# Patient Record
Sex: MALE | Race: WHITE | URBAN - METROPOLITAN AREA
[De-identification: names, ages, dates, MRNs, and addresses within clinical notes are randomized per-mention and may not be internally consistent; named-entity substitution may affect disease eponyms.]

---

## 2017-08-18 ENCOUNTER — HOSPITAL ENCOUNTER (OUTPATIENT)
Dept: OTHER | Age: 67
Discharge: HOME | End: 2017-08-18
Attending: NURSE PRACTITIONER | Admitting: NURSE PRACTITIONER

## 2017-08-18 DIAGNOSIS — M79.671 RIGHT FOOT PAIN: ICD-10-CM

## 2017-08-18 DIAGNOSIS — M25.571 ACUTE RIGHT ANKLE PAIN: ICD-10-CM

## 2017-08-30 ENCOUNTER — OFFICE VISIT (OUTPATIENT)
Dept: ORTHOPEDIC SURGERY | Age: 67
End: 2017-08-30

## 2017-08-30 VITALS
SYSTOLIC BLOOD PRESSURE: 135 MMHG | BODY MASS INDEX: 41.52 KG/M2 | DIASTOLIC BLOOD PRESSURE: 72 MMHG | WEIGHT: 290 LBS | HEIGHT: 70 IN | HEART RATE: 78 BPM

## 2017-08-30 DIAGNOSIS — M76.61 ACHILLES TENDINITIS, RIGHT LEG: ICD-10-CM

## 2017-08-30 DIAGNOSIS — M76.71 PERONEAL TENDINITIS, RIGHT: ICD-10-CM

## 2017-08-30 DIAGNOSIS — M25.371 ANKLE INSTABILITY, RIGHT: Primary | ICD-10-CM

## 2017-08-30 PROBLEM — M76.70 PERONEAL TENDINITIS: Status: ACTIVE | Noted: 2017-08-30

## 2017-08-30 PROBLEM — M25.373 ANKLE INSTABILITY: Status: ACTIVE | Noted: 2017-08-30

## 2017-08-30 PROCEDURE — 1123F ACP DISCUSS/DSCN MKR DOCD: CPT | Performed by: PODIATRIST

## 2017-08-30 PROCEDURE — G8427 DOCREV CUR MEDS BY ELIG CLIN: HCPCS | Performed by: PODIATRIST

## 2017-08-30 PROCEDURE — 99203 OFFICE O/P NEW LOW 30 MIN: CPT | Performed by: PODIATRIST

## 2017-08-30 PROCEDURE — L1902 AFO ANKLE GAUNTLET PRE OTS: HCPCS | Performed by: PODIATRIST

## 2017-08-30 PROCEDURE — 1036F TOBACCO NON-USER: CPT | Performed by: PODIATRIST

## 2017-08-30 PROCEDURE — G8417 CALC BMI ABV UP PARAM F/U: HCPCS | Performed by: PODIATRIST

## 2017-08-30 PROCEDURE — 3017F COLORECTAL CA SCREEN DOC REV: CPT | Performed by: PODIATRIST

## 2017-08-30 PROCEDURE — 4040F PNEUMOC VAC/ADMIN/RCVD: CPT | Performed by: PODIATRIST

## 2025-03-31 ENCOUNTER — TRANSCRIBE ORDERS (OUTPATIENT)
Dept: ADMINISTRATIVE | Age: 75
End: 2025-03-31

## 2025-03-31 DIAGNOSIS — M79.89 SWELLING OF RIGHT LOWER EXTREMITY: Primary | ICD-10-CM

## 2025-04-02 ENCOUNTER — OFFICE VISIT (OUTPATIENT)
Dept: ORTHOPEDIC SURGERY | Age: 75
End: 2025-04-02
Payer: MEDICARE

## 2025-04-02 VITALS — HEIGHT: 71 IN | WEIGHT: 290 LBS | BODY MASS INDEX: 40.6 KG/M2

## 2025-04-02 DIAGNOSIS — M25.561 RIGHT KNEE PAIN, UNSPECIFIED CHRONICITY: Primary | ICD-10-CM

## 2025-04-02 PROCEDURE — 1123F ACP DISCUSS/DSCN MKR DOCD: CPT | Performed by: STUDENT IN AN ORGANIZED HEALTH CARE EDUCATION/TRAINING PROGRAM

## 2025-04-02 PROCEDURE — G8427 DOCREV CUR MEDS BY ELIG CLIN: HCPCS | Performed by: STUDENT IN AN ORGANIZED HEALTH CARE EDUCATION/TRAINING PROGRAM

## 2025-04-02 PROCEDURE — 1125F AMNT PAIN NOTED PAIN PRSNT: CPT | Performed by: STUDENT IN AN ORGANIZED HEALTH CARE EDUCATION/TRAINING PROGRAM

## 2025-04-02 PROCEDURE — 20610 DRAIN/INJ JOINT/BURSA W/O US: CPT | Performed by: STUDENT IN AN ORGANIZED HEALTH CARE EDUCATION/TRAINING PROGRAM

## 2025-04-02 PROCEDURE — 1159F MED LIST DOCD IN RCRD: CPT | Performed by: STUDENT IN AN ORGANIZED HEALTH CARE EDUCATION/TRAINING PROGRAM

## 2025-04-02 PROCEDURE — 1036F TOBACCO NON-USER: CPT | Performed by: STUDENT IN AN ORGANIZED HEALTH CARE EDUCATION/TRAINING PROGRAM

## 2025-04-02 PROCEDURE — 99203 OFFICE O/P NEW LOW 30 MIN: CPT | Performed by: STUDENT IN AN ORGANIZED HEALTH CARE EDUCATION/TRAINING PROGRAM

## 2025-04-02 PROCEDURE — G8417 CALC BMI ABV UP PARAM F/U: HCPCS | Performed by: STUDENT IN AN ORGANIZED HEALTH CARE EDUCATION/TRAINING PROGRAM

## 2025-04-02 PROCEDURE — 3017F COLORECTAL CA SCREEN DOC REV: CPT | Performed by: STUDENT IN AN ORGANIZED HEALTH CARE EDUCATION/TRAINING PROGRAM

## 2025-04-02 RX ORDER — LIDOCAINE HYDROCHLORIDE 10 MG/ML
4 INJECTION, SOLUTION INFILTRATION; PERINEURAL ONCE
Status: COMPLETED | OUTPATIENT
Start: 2025-04-02 | End: 2025-04-02

## 2025-04-02 RX ORDER — METHYLPREDNISOLONE ACETATE 40 MG/ML
80 INJECTION, SUSPENSION INTRA-ARTICULAR; INTRALESIONAL; INTRAMUSCULAR; SOFT TISSUE ONCE
Status: COMPLETED | OUTPATIENT
Start: 2025-04-02 | End: 2025-04-02

## 2025-04-02 RX ADMIN — METHYLPREDNISOLONE ACETATE 80 MG: 40 INJECTION, SUSPENSION INTRA-ARTICULAR; INTRALESIONAL; INTRAMUSCULAR; SOFT TISSUE at 14:59

## 2025-04-02 RX ADMIN — LIDOCAINE HYDROCHLORIDE 4 ML: 10 INJECTION, SOLUTION INFILTRATION; PERINEURAL at 14:58

## 2025-04-02 NOTE — PROGRESS NOTES
Date:  2025    Name:  Ramana Albert  Address:  [address] [facility] [address]    :  1950      Age:   74 y.o.    SSN:  [id number]      Medical Record Number:  [id number]    Reason for Visit:    Chief Complaint    Knee Pain (NP RT. KNEE)      DOS:2025     HPI: Ramana Albert is a 74 y.o. male here today for new patient evaluation regarding his right knee    History of Present Illness  The patient is a 74-year-old male here today for a new patient evaluation regarding his right knee. He was referred by Maddi from [facility] due to persistent right knee pain that has been troubling him for the past 2 weeks. This is his first encounter with such an issue, and he has no history of previous surgeries on the same knee. He recalls a recent incident where he descended a ladder awkwardly, resulting in a twist to his knee. Despite expecting the pain to subside within a few days, it has remained constant.     He reports no instances of his leg locking or giving way and also reports no numbness in the leg. He does not experience any sensations of grinding, clicking, or popping in the knee. Discomfort is noted when seated in his car due to the bolster pressing against his knee, and prolonged driving exacerbates this discomfort. He can stand without experiencing pain, but walking and twisting movements trigger the pain. He is not diabetic.    SOCIAL HISTORY  Occupations: Retired           ROS: All systems reviewed on patient intake form.  Pertinent items are noted in HPI.        No past medical history on file.     No past surgical history on file.    No family history on file.    Social History     Socioeconomic History    Marital status:      Spouse name: None    Number of children: None    Years of education: None    Highest education level: None   Tobacco Use    Smoking status: Never    Smokeless tobacco: Never   Substance and Sexual Activity    Alcohol use: Yes     Comment: social

## 2025-07-01 ENCOUNTER — OFFICE VISIT (OUTPATIENT)
Dept: ORTHOPEDIC SURGERY | Age: 75
End: 2025-07-01
Payer: MEDICARE

## 2025-07-01 DIAGNOSIS — M25.561 RIGHT KNEE PAIN, UNSPECIFIED CHRONICITY: Primary | ICD-10-CM

## 2025-07-01 PROCEDURE — 99214 OFFICE O/P EST MOD 30 MIN: CPT | Performed by: STUDENT IN AN ORGANIZED HEALTH CARE EDUCATION/TRAINING PROGRAM

## 2025-07-01 PROCEDURE — 1123F ACP DISCUSS/DSCN MKR DOCD: CPT | Performed by: STUDENT IN AN ORGANIZED HEALTH CARE EDUCATION/TRAINING PROGRAM

## 2025-07-01 PROCEDURE — 1159F MED LIST DOCD IN RCRD: CPT | Performed by: STUDENT IN AN ORGANIZED HEALTH CARE EDUCATION/TRAINING PROGRAM

## 2025-07-01 PROCEDURE — 1036F TOBACCO NON-USER: CPT | Performed by: STUDENT IN AN ORGANIZED HEALTH CARE EDUCATION/TRAINING PROGRAM

## 2025-07-01 PROCEDURE — G8417 CALC BMI ABV UP PARAM F/U: HCPCS | Performed by: STUDENT IN AN ORGANIZED HEALTH CARE EDUCATION/TRAINING PROGRAM

## 2025-07-01 PROCEDURE — 3017F COLORECTAL CA SCREEN DOC REV: CPT | Performed by: STUDENT IN AN ORGANIZED HEALTH CARE EDUCATION/TRAINING PROGRAM

## 2025-07-01 PROCEDURE — G8427 DOCREV CUR MEDS BY ELIG CLIN: HCPCS | Performed by: STUDENT IN AN ORGANIZED HEALTH CARE EDUCATION/TRAINING PROGRAM

## 2025-07-01 NOTE — PROGRESS NOTES
pain. He is not diabetic.    Pain Assessment  Location of Pain: Knee  Location Modifiers: Right  Severity of Pain: 10  Quality of Pain: Sharp, Throbbing  Duration of Pain: Persistent  Frequency of Pain: Constant    Medical History:  Patient's medications, allergies, past medical, surgical, social and family histories were reviewed and updated as appropriate.    Pertinent items are noted in HPI  Review of systems reviewed from Patient History Form dated on 7/1/25 and available in the patient's chart under the Media tab.       Vital Signs:  There were no vitals filed for this visit.      Constitutional: In no apparent distress. Normal affect. Alert and oriented X3 and is cooperative.       Physical Exam  Musculoskeletal:  Left knee: Exquisite tenderness along the medial joint line with a positive Apley grind and a positive Leann. Range of motion is 0 to 90 degrees. Significant pain with full extension in the posterior medial aspect.  Musculoskeletal:  Right Knee:  - Full range of motion with minimal pain.  - No patellofemoral crepitation.  - Very tender to the medial joint line.  - Positive Apley grind and Leann to the medial joint.  Others: Increased nonpitting edema noted to the calf but nontender to palpation about the calf. Negative Homans' sign                 Diagnostics:  Radiology:        4 views of the right knee demonstrate mild patellofemoral osteoarthritis with overall well-preserved medial lateral joint spaces.     Results  Imaging   - X-ray of the knees: No significant arthritis           Assessment: 74 y.o. male with acute right knee injury concern for medial meniscus tear, mild arthritis     Comorbidities: BMI 40    Impression:  Encounter Diagnosis   Name Primary?    Right knee pain, unspecified chronicity Yes       Office Procedures:  Orders Placed This Encounter   Procedures    MRI KNEE RIGHT WO CONTRAST     Standing Status:   Future     Expected Date:   7/1/2025     Expiration Date:   7/1/2026

## 2025-07-07 ENCOUNTER — TELEPHONE (OUTPATIENT)
Dept: ORTHOPEDIC SURGERY | Age: 75
End: 2025-07-07

## 2025-07-07 NOTE — TELEPHONE ENCOUNTER
No Pre-Cert for MRI for Rt Knee   Patients primary insurance is Medicare A&B   Medicare A&B does Not Require Pre-Cert.     PT INFORMED HE CAN CALL [facility] TO SCHEDULE MRI IT HAS BEEN FAXED TO THEM

## 2025-07-15 ENCOUNTER — TELEPHONE (OUTPATIENT)
Dept: ORTHOPEDIC SURGERY | Age: 75
End: 2025-07-15

## 2025-07-15 NOTE — TELEPHONE ENCOUNTER
Prescription Refill      Medication Name: PAIN MEDS   Pharmacy: [facility]  Patient Contact Number: [phone]    Please call patient to make aware once meds are sent

## 2025-07-18 ENCOUNTER — HOSPITAL ENCOUNTER (OUTPATIENT)
Dept: LAB | Age: 75
Discharge: HOME | End: 2025-07-18
Payer: MEDICARE

## 2025-07-18 ENCOUNTER — TELEPHONE (OUTPATIENT)
Dept: ORTHOPEDIC SURGERY | Age: 75
End: 2025-07-18

## 2025-07-18 ENCOUNTER — OFFICE VISIT (OUTPATIENT)
Dept: ORTHOPEDIC SURGERY | Age: 75
End: 2025-07-18

## 2025-07-18 VITALS — BODY MASS INDEX: 40.6 KG/M2 | HEIGHT: 71 IN | WEIGHT: 290 LBS

## 2025-07-18 DIAGNOSIS — M25.561 RIGHT KNEE PAIN, UNSPECIFIED CHRONICITY: Primary | ICD-10-CM

## 2025-07-18 DIAGNOSIS — S83.231A COMPLEX TEAR OF MEDIAL MENISCUS OF RIGHT KNEE AS CURRENT INJURY, INITIAL ENCOUNTER: ICD-10-CM

## 2025-07-18 LAB
ANION GAP SERPL CALCULATED.3IONS-SCNC: 11 MMOL/L (ref 3–16)
BASOPHILS # BLD: 0.1 K/UL (ref 0–0.2)
BASOPHILS NFR BLD: 1.1 %
BUN SERPL-MCNC: 26 MG/DL (ref 7–20)
CALCIUM SERPL-MCNC: 9.3 MG/DL (ref 8.3–10.6)
CHLORIDE SERPL-SCNC: 105 MMOL/L (ref 99–110)
CO2 SERPL-SCNC: 25 MMOL/L (ref 21–32)
CREAT SERPL-MCNC: 1 MG/DL (ref 0.8–1.3)
DEPRECATED RDW RBC AUTO: 17.1 % (ref 12.4–15.4)
EOSINOPHIL # BLD: 0.3 K/UL (ref 0–0.6)
EOSINOPHIL NFR BLD: 4.1 %
GFR SERPLBLD CREATININE-BSD FMLA CKD-EPI: 79 ML/MIN/{1.73_M2}
GLUCOSE SERPL-MCNC: 131 MG/DL (ref 70–99)
HCT VFR BLD AUTO: 42.7 % (ref 40.5–52.5)
HGB BLD-MCNC: 13.5 G/DL (ref 13.5–17.5)
LYMPHOCYTES # BLD: 2 K/UL (ref 1–5.1)
LYMPHOCYTES NFR BLD: 25.9 %
MCH RBC QN AUTO: 22.8 PG (ref 26–34)
MCHC RBC AUTO-ENTMCNC: 31.5 G/DL (ref 31–36)
MCV RBC AUTO: 72.2 FL (ref 80–100)
MONOCYTES # BLD: 0.9 K/UL (ref 0–1.3)
MONOCYTES NFR BLD: 11.8 %
NEUTROPHILS # BLD: 4.4 K/UL (ref 1.7–7.7)
NEUTROPHILS NFR BLD: 57.1 %
PLATELET # BLD AUTO: 204 K/UL (ref 135–450)
PMV BLD AUTO: 9.2 FL (ref 5–10.5)
POTASSIUM SERPL-SCNC: 4.6 MMOL/L (ref 3.5–5.1)
RBC # BLD AUTO: 5.91 M/UL (ref 4.2–5.9)
SODIUM SERPL-SCNC: 141 MMOL/L (ref 136–145)
WBC # BLD AUTO: 7.7 K/UL (ref 4–11)

## 2025-07-18 PROCEDURE — 85025 COMPLETE CBC W/AUTO DIFF WBC: CPT

## 2025-07-18 PROCEDURE — 80048 BASIC METABOLIC PNL TOTAL CA: CPT

## 2025-07-18 RX ORDER — CYCLOBENZAPRINE HCL 5 MG
5 TABLET ORAL NIGHTLY
Qty: 10 TABLET | Refills: 0 | Status: SHIPPED | OUTPATIENT
Start: 2025-07-18 | End: 2025-07-28

## 2025-07-18 RX ORDER — TRAMADOL HYDROCHLORIDE 50 MG/1
50 TABLET ORAL EVERY 4 HOURS PRN
Qty: 42 TABLET | Refills: 0 | Status: SHIPPED | OUTPATIENT
Start: 2025-07-18 | End: 2025-07-25

## 2025-07-18 NOTE — PROGRESS NOTES
Chief Complaint  Knee Pain (CK RT. KNEE MRI RESULTS)      History of Present Illness  The patient is a 75-year-old male here today for repeat evaluation regarding his right knee. He reports that his condition has slightly improved, but he continues to experience severe pain in his right knee. 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 He has also noticed the development of bedsores due to his limited mobility. Over this period, he has gained 33 pounds. The constant pain has led him to reduce his physical activity, as it persists regardless of movement. He expresses concern about potential further damage to his knee from bearing weight on it. His sleep has been disrupted due to the pain. He has tried using Voltaren for relief, but it has not been effective.    SOCIAL HISTORY  Occupations: Retired, rehabs houses    Prior HPI 7/1/25:  The patient is a 75-year-old male here today for repeat evaluation regarding his right knee. He reports that the initial injection did not seem to provide relief, but over time, he experienced gradual improvement. The pain subsided to the extent that he was able to walk without the aid of his walking stick. However, approximately 8 or 9 days ago, he experienced a sudden onset of severe pain in the same area after twisting his leg while standing up from a seated position. This incident resulted in a significant increase in pain, which he describes as being more intense than the initial episode. He also notes that the pain is now present even when he is not walking, and it has been disrupting his sleep.     He is not diabetic and is not on blood thinners. He is not having any kidney issues. He recalls that he had a similar experience with a meniscus tear in the past.     Prior HPI 4/2/25:  The patient is a 74-year-old male here today for a new patient evaluation regarding his right knee. He was referred by Maddi

## 2025-07-21 ENCOUNTER — TELEPHONE (OUTPATIENT)
Dept: ORTHOPEDIC SURGERY | Age: 75
End: 2025-07-21

## 2025-07-21 PROBLEM — S83.231A COMPLEX TEAR OF MEDIAL MENISCUS OF RIGHT KNEE AS CURRENT INJURY: Status: ACTIVE | Noted: 2025-07-21

## 2025-07-21 NOTE — TELEPHONE ENCOUNTER
Spoke with patient, he tried calling back several times on Friday at the [place] location and was told I was not there... he is now scheduled

## 2025-07-21 NOTE — TELEPHONE ENCOUNTER
----- Message from Megan NAVA sent at 7/21/2025  9:30 AM EDT -----  Regarding: Specialty Message to Provider  Specialty Message to Provider    Relationship to Patient: Self     Patient Message: REQUESTING A CALL FROM [facility] TO SCHED SURGERY. HE BEEN TRYING  SINCE FRIDAY.  --------------------------------------------------------------------------------------------------------------------------    Call Back Information: OK to leave message on voicemail  Preferred Call Back Number:  [phone]

## 2025-07-23 ENCOUNTER — TELEPHONE (OUTPATIENT)
Dept: ORTHOPEDIC SURGERY | Age: 75
End: 2025-07-23

## 2025-07-23 ENCOUNTER — HOSPITAL ENCOUNTER (OUTPATIENT)
Age: 75
Setting detail: OUTPATIENT SURGERY
Discharge: HOME | End: 2025-07-23
Attending: STUDENT IN AN ORGANIZED HEALTH CARE EDUCATION/TRAINING PROGRAM | Admitting: STUDENT IN AN ORGANIZED HEALTH CARE EDUCATION/TRAINING PROGRAM
Payer: MEDICARE

## 2025-07-23 ENCOUNTER — ANESTHESIA EVENT (OUTPATIENT)
Dept: OPERATING ROOM | Age: 75
End: 2025-07-23
Payer: MEDICARE

## 2025-07-23 ENCOUNTER — ANESTHESIA (OUTPATIENT)
Dept: OPERATING ROOM | Age: 75
End: 2025-07-23
Payer: MEDICARE

## 2025-07-23 VITALS
SYSTOLIC BLOOD PRESSURE: 132 MMHG | DIASTOLIC BLOOD PRESSURE: 78 MMHG | WEIGHT: 290 LBS | RESPIRATION RATE: 17 BRPM | HEART RATE: 80 BPM | TEMPERATURE: 98 F | BODY MASS INDEX: 40.6 KG/M2 | HEIGHT: 71 IN | OXYGEN SATURATION: 96 %

## 2025-07-23 DIAGNOSIS — S83.231A COMPLEX TEAR OF MEDIAL MENISCUS OF RIGHT KNEE AS CURRENT INJURY, INITIAL ENCOUNTER: Primary | ICD-10-CM

## 2025-07-23 PROCEDURE — 7100000000 HC PACU RECOVERY - FIRST 15 MIN: Performed by: STUDENT IN AN ORGANIZED HEALTH CARE EDUCATION/TRAINING PROGRAM

## 2025-07-23 PROCEDURE — 3700000000 HC ANESTHESIA ATTENDED CARE: Performed by: STUDENT IN AN ORGANIZED HEALTH CARE EDUCATION/TRAINING PROGRAM

## 2025-07-23 PROCEDURE — 2580000003 HC RX 258: Performed by: STUDENT IN AN ORGANIZED HEALTH CARE EDUCATION/TRAINING PROGRAM

## 2025-07-23 PROCEDURE — 6360000002 HC RX W HCPCS: Performed by: STUDENT IN AN ORGANIZED HEALTH CARE EDUCATION/TRAINING PROGRAM

## 2025-07-23 PROCEDURE — 6370000000 HC RX 637 (ALT 250 FOR IP): Performed by: ANESTHESIOLOGY

## 2025-07-23 PROCEDURE — 2709999900 HC NON-CHARGEABLE SUPPLY: Performed by: STUDENT IN AN ORGANIZED HEALTH CARE EDUCATION/TRAINING PROGRAM

## 2025-07-23 PROCEDURE — 6360000002 HC RX W HCPCS: Performed by: ANESTHESIOLOGY

## 2025-07-23 PROCEDURE — 2500000003 HC RX 250 WO HCPCS: Performed by: STUDENT IN AN ORGANIZED HEALTH CARE EDUCATION/TRAINING PROGRAM

## 2025-07-23 PROCEDURE — 2580000003 HC RX 258: Performed by: ANESTHESIOLOGY

## 2025-07-23 PROCEDURE — 3600000014 HC SURGERY LEVEL 4 ADDTL 15MIN: Performed by: STUDENT IN AN ORGANIZED HEALTH CARE EDUCATION/TRAINING PROGRAM

## 2025-07-23 PROCEDURE — 3700000001 HC ADD 15 MINUTES (ANESTHESIA): Performed by: STUDENT IN AN ORGANIZED HEALTH CARE EDUCATION/TRAINING PROGRAM

## 2025-07-23 PROCEDURE — 3600000004 HC SURGERY LEVEL 4 BASE: Performed by: STUDENT IN AN ORGANIZED HEALTH CARE EDUCATION/TRAINING PROGRAM

## 2025-07-23 PROCEDURE — 6360000002 HC RX W HCPCS: Performed by: NURSE ANESTHETIST, CERTIFIED REGISTERED

## 2025-07-23 PROCEDURE — 7100000010 HC PHASE II RECOVERY - FIRST 15 MIN: Performed by: STUDENT IN AN ORGANIZED HEALTH CARE EDUCATION/TRAINING PROGRAM

## 2025-07-23 PROCEDURE — 7100000001 HC PACU RECOVERY - ADDTL 15 MIN: Performed by: STUDENT IN AN ORGANIZED HEALTH CARE EDUCATION/TRAINING PROGRAM

## 2025-07-23 PROCEDURE — 29881 ARTHRS KNE SRG MNISECTMY M/L: CPT | Performed by: STUDENT IN AN ORGANIZED HEALTH CARE EDUCATION/TRAINING PROGRAM

## 2025-07-23 PROCEDURE — 7100000011 HC PHASE II RECOVERY - ADDTL 15 MIN: Performed by: STUDENT IN AN ORGANIZED HEALTH CARE EDUCATION/TRAINING PROGRAM

## 2025-07-23 RX ORDER — BUPIVACAINE HYDROCHLORIDE 2.5 MG/ML
INJECTION, SOLUTION EPIDURAL; INFILTRATION; INTRACAUDAL; PERINEURAL PRN
Status: DISCONTINUED | OUTPATIENT
Start: 2025-07-23 | End: 2025-07-23 | Stop reason: ALTCHOICE

## 2025-07-23 RX ORDER — DEXAMETHASONE SODIUM PHOSPHATE 4 MG/ML
INJECTION, SOLUTION INTRA-ARTICULAR; INTRALESIONAL; INTRAMUSCULAR; INTRAVENOUS; SOFT TISSUE
Status: DISCONTINUED | OUTPATIENT
Start: 2025-07-23 | End: 2025-07-23 | Stop reason: SDUPTHER

## 2025-07-23 RX ORDER — ONDANSETRON 2 MG/ML
4 INJECTION INTRAMUSCULAR; INTRAVENOUS
Status: DISCONTINUED | OUTPATIENT
Start: 2025-07-23 | End: 2025-07-23 | Stop reason: HOSPADM

## 2025-07-23 RX ORDER — LIDOCAINE HYDROCHLORIDE 10 MG/ML
0.3 INJECTION, SOLUTION EPIDURAL; INFILTRATION; INTRACAUDAL; PERINEURAL
Status: DISCONTINUED | OUTPATIENT
Start: 2025-07-23 | End: 2025-07-23 | Stop reason: HOSPADM

## 2025-07-23 RX ORDER — SODIUM CHLORIDE 9 MG/ML
INJECTION, SOLUTION INTRAVENOUS PRN
Status: DISCONTINUED | OUTPATIENT
Start: 2025-07-23 | End: 2025-07-23 | Stop reason: HOSPADM

## 2025-07-23 RX ORDER — ONDANSETRON 4 MG/1
4 TABLET, FILM COATED ORAL 3 TIMES DAILY PRN
Qty: 15 TABLET | Refills: 0 | Status: SHIPPED | OUTPATIENT
Start: 2025-07-23

## 2025-07-23 RX ORDER — SODIUM CHLORIDE 0.9 % (FLUSH) 0.9 %
5-40 SYRINGE (ML) INJECTION EVERY 12 HOURS SCHEDULED
Status: DISCONTINUED | OUTPATIENT
Start: 2025-07-23 | End: 2025-07-23 | Stop reason: SDUPTHER

## 2025-07-23 RX ORDER — SODIUM CHLORIDE 0.9 % (FLUSH) 0.9 %
5-40 SYRINGE (ML) INJECTION EVERY 12 HOURS SCHEDULED
Status: DISCONTINUED | OUTPATIENT
Start: 2025-07-23 | End: 2025-07-23 | Stop reason: HOSPADM

## 2025-07-23 RX ORDER — SODIUM CHLORIDE 0.9 % (FLUSH) 0.9 %
5-40 SYRINGE (ML) INJECTION PRN
Status: DISCONTINUED | OUTPATIENT
Start: 2025-07-23 | End: 2025-07-23 | Stop reason: SDUPTHER

## 2025-07-23 RX ORDER — ONDANSETRON 2 MG/ML
INJECTION INTRAMUSCULAR; INTRAVENOUS
Status: DISCONTINUED | OUTPATIENT
Start: 2025-07-23 | End: 2025-07-23 | Stop reason: SDUPTHER

## 2025-07-23 RX ORDER — DIPHENHYDRAMINE HYDROCHLORIDE 50 MG/ML
12.5 INJECTION, SOLUTION INTRAMUSCULAR; INTRAVENOUS
Status: DISCONTINUED | OUTPATIENT
Start: 2025-07-23 | End: 2025-07-23 | Stop reason: HOSPADM

## 2025-07-23 RX ORDER — ASPIRIN 81 MG/1
81 TABLET ORAL 2 TIMES DAILY
Qty: 28 TABLET | Refills: 0 | Status: SHIPPED | OUTPATIENT
Start: 2025-07-23 | End: 2025-08-06

## 2025-07-23 RX ORDER — SODIUM CHLORIDE 0.9 % (FLUSH) 0.9 %
5-40 SYRINGE (ML) INJECTION PRN
Status: DISCONTINUED | OUTPATIENT
Start: 2025-07-23 | End: 2025-07-23 | Stop reason: HOSPADM

## 2025-07-23 RX ORDER — OXYCODONE HYDROCHLORIDE 5 MG/1
10 TABLET ORAL PRN
Status: COMPLETED | OUTPATIENT
Start: 2025-07-23 | End: 2025-07-23

## 2025-07-23 RX ORDER — MAGNESIUM HYDROXIDE 1200 MG/15ML
LIQUID ORAL CONTINUOUS PRN
Status: COMPLETED | OUTPATIENT
Start: 2025-07-23 | End: 2025-07-23

## 2025-07-23 RX ORDER — KETOROLAC TROMETHAMINE 30 MG/ML
INJECTION, SOLUTION INTRAMUSCULAR; INTRAVENOUS
Status: DISCONTINUED | OUTPATIENT
Start: 2025-07-23 | End: 2025-07-23 | Stop reason: SDUPTHER

## 2025-07-23 RX ORDER — OXYCODONE HYDROCHLORIDE 5 MG/1
5 TABLET ORAL PRN
Status: COMPLETED | OUTPATIENT
Start: 2025-07-23 | End: 2025-07-23

## 2025-07-23 RX ORDER — SODIUM CHLORIDE 9 MG/ML
INJECTION, SOLUTION INTRAVENOUS PRN
Status: DISCONTINUED | OUTPATIENT
Start: 2025-07-23 | End: 2025-07-23 | Stop reason: SDUPTHER

## 2025-07-23 RX ORDER — POLYETHYLENE GLYCOL 3350 17 G/17G
17 POWDER, FOR SOLUTION ORAL DAILY
Qty: 510 G | Refills: 0 | Status: SHIPPED | OUTPATIENT
Start: 2025-07-23 | End: 2025-08-22

## 2025-07-23 RX ORDER — LABETALOL HYDROCHLORIDE 5 MG/ML
5 INJECTION, SOLUTION INTRAVENOUS EVERY 10 MIN PRN
Status: DISCONTINUED | OUTPATIENT
Start: 2025-07-23 | End: 2025-07-23 | Stop reason: HOSPADM

## 2025-07-23 RX ORDER — PROPOFOL 10 MG/ML
INJECTION, EMULSION INTRAVENOUS
Status: DISCONTINUED | OUTPATIENT
Start: 2025-07-23 | End: 2025-07-23 | Stop reason: SDUPTHER

## 2025-07-23 RX ORDER — HYDROCODONE BITARTRATE AND ACETAMINOPHEN 5; 325 MG/1; MG/1
1 TABLET ORAL EVERY 4 HOURS PRN
Qty: 18 TABLET | Refills: 0 | Status: SHIPPED | OUTPATIENT
Start: 2025-07-23 | End: 2025-07-26

## 2025-07-23 RX ORDER — SODIUM CHLORIDE, SODIUM LACTATE, POTASSIUM CHLORIDE, CALCIUM CHLORIDE 600; 310; 30; 20 MG/100ML; MG/100ML; MG/100ML; MG/100ML
INJECTION, SOLUTION INTRAVENOUS CONTINUOUS
Status: DISCONTINUED | OUTPATIENT
Start: 2025-07-23 | End: 2025-07-23 | Stop reason: HOSPADM

## 2025-07-23 RX ORDER — FENTANYL CITRATE 50 UG/ML
INJECTION, SOLUTION INTRAMUSCULAR; INTRAVENOUS
Status: DISCONTINUED | OUTPATIENT
Start: 2025-07-23 | End: 2025-07-23 | Stop reason: SDUPTHER

## 2025-07-23 RX ORDER — MEPERIDINE HYDROCHLORIDE 25 MG/ML
12.5 INJECTION INTRAMUSCULAR; INTRAVENOUS; SUBCUTANEOUS EVERY 5 MIN PRN
Status: DISCONTINUED | OUTPATIENT
Start: 2025-07-23 | End: 2025-07-23 | Stop reason: HOSPADM

## 2025-07-23 RX ORDER — LIDOCAINE HYDROCHLORIDE 20 MG/ML
INJECTION, SOLUTION INFILTRATION; PERINEURAL
Status: DISCONTINUED | OUTPATIENT
Start: 2025-07-23 | End: 2025-07-23 | Stop reason: SDUPTHER

## 2025-07-23 RX ADMIN — ONDANSETRON 4 MG: 2 INJECTION, SOLUTION INTRAMUSCULAR; INTRAVENOUS at 10:35

## 2025-07-23 RX ADMIN — LIDOCAINE HYDROCHLORIDE 60 MG: 20 INJECTION, SOLUTION INFILTRATION; PERINEURAL at 10:32

## 2025-07-23 RX ADMIN — FENTANYL CITRATE 50 MCG: 50 INJECTION INTRAMUSCULAR; INTRAVENOUS at 10:56

## 2025-07-23 RX ADMIN — OXYCODONE 5 MG: 5 TABLET ORAL at 12:34

## 2025-07-23 RX ADMIN — KETOROLAC TROMETHAMINE 30 MG: 30 INJECTION, SOLUTION INTRAMUSCULAR at 11:16

## 2025-07-23 RX ADMIN — DEXAMETHASONE SODIUM PHOSPHATE 4 MG: 4 INJECTION INTRA-ARTICULAR; INTRALESIONAL; INTRAMUSCULAR; INTRAVENOUS; SOFT TISSUE at 10:35

## 2025-07-23 RX ADMIN — SODIUM CHLORIDE 3000 MG: 9 INJECTION, SOLUTION INTRAVENOUS at 10:35

## 2025-07-23 RX ADMIN — SODIUM CHLORIDE, SODIUM LACTATE, POTASSIUM CHLORIDE, AND CALCIUM CHLORIDE: .6; .31; .03; .02 INJECTION, SOLUTION INTRAVENOUS at 09:41

## 2025-07-23 RX ADMIN — HYDROMORPHONE HYDROCHLORIDE 0.5 MG: 1 INJECTION, SOLUTION INTRAMUSCULAR; INTRAVENOUS; SUBCUTANEOUS at 12:10

## 2025-07-23 RX ADMIN — FENTANYL CITRATE 50 MCG: 50 INJECTION INTRAMUSCULAR; INTRAVENOUS at 10:35

## 2025-07-23 RX ADMIN — PROPOFOL 200 MG: 10 INJECTION, EMULSION INTRAVENOUS at 10:32

## 2025-07-23 RX ADMIN — HYDROMORPHONE HYDROCHLORIDE 0.5 MG: 1 INJECTION, SOLUTION INTRAMUSCULAR; INTRAVENOUS; SUBCUTANEOUS at 11:32

## 2025-07-23 ASSESSMENT — PAIN DESCRIPTION - LOCATION
LOCATION: KNEE

## 2025-07-23 ASSESSMENT — PAIN DESCRIPTION - PAIN TYPE
TYPE: SURGICAL PAIN

## 2025-07-23 ASSESSMENT — PAIN DESCRIPTION - ORIENTATION
ORIENTATION: RIGHT

## 2025-07-23 ASSESSMENT — PAIN SCALES - GENERAL
PAINLEVEL_OUTOF10: 7
PAINLEVEL_OUTOF10: 4
PAINLEVEL_OUTOF10: 7

## 2025-07-23 ASSESSMENT — PAIN DESCRIPTION - DESCRIPTORS
DESCRIPTORS: ACHING

## 2025-07-23 ASSESSMENT — PAIN - FUNCTIONAL ASSESSMENT: PAIN_FUNCTIONAL_ASSESSMENT: 0-10

## 2025-07-23 NOTE — OP NOTE
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  The patient failed extensive nonoperative therapies including anti-inflammatory medications and time.  Operative indications were met for R knee arthroscopy with partial medial meniscectomy .    Operative summary:  The patient was met in the preoperative area the appropriate surgical site was marked.  Written consent was obtained after discussing the risks, benefits, and alternative treatment options with the patient in detail.  The patient agreed to move forward with proposed procedure.  The patient was wheeled back to the operative suite placed on the operative table supine position.  General anesthesia was induced by anesthesia team without complication.  A nonsterile tourniquet was applied to the thigh.  The thigh was placed into an arthroscopic leg ramos.  The bed was reflexed to take pressure off the low back and the contralateral femoral nerve.  A well leg

## 2025-07-23 NOTE — ANESTHESIA PRE PROCEDURE
Department of Anesthesiology  Preprocedure Note       Name:  Ramana Albert   Age:  75 y.o.  :  1950                                          MRN:  [id number]         Date:  2025      Surgeon: Surgeon(s):  Jonny Parker DO    Procedure: Procedure(s):  RIGHT KNEE VIDEO ARTHROSCOPY PARTIAL MEDIAL MENISCECTOMY    Medications prior to admission:   Prior to Admission medications    Medication Sig Start Date End Date Taking? Authorizing Provider   traMADol (ULTRAM) 50 MG tablet Take 1 tablet by mouth every 4 hours as needed for Pain for up to 7 days. Intended supply: 7 days. Take lowest dose possible to manage pain Max Daily Amount: 300 mg  Patient not taking: Reported on 2025  Jonny Parker DO   cyclobenzaprine (FLEXERIL) 5 MG tablet Take 1 tablet by mouth nightly for 10 days  Patient not taking: Reported on 2025  Jonny Parker DO   diclofenac (VOLTAREN) 50 MG EC tablet Take 1 tablet by mouth 2 times daily (with meals)  Patient not taking: Reported on 2025   Jonny Parker DO       Current medications:    Current Facility-Administered Medications   Medication Dose Route Frequency Provider Last Rate Last Admin    lidocaine PF 1 % injection 0.3 mL  0.3 mL IntraDERmal Once PRN Isaac Hyman MD        lactated ringers infusion   IntraVENous Continuous Isaac Hyman MD        sodium chloride flush 0.9 % injection 5-40 mL  5-40 mL IntraVENous 2 times per day Isaac Hyman MD        sodium chloride flush 0.9 % injection 5-40 mL  5-40 mL IntraVENous PRN Isaac Hyman MD        0.9 % sodium chloride infusion   IntraVENous PRN Isaac Hyman MD        ceFAZolin (ANCEF) 3,000 mg in sodium chloride 0.9 % 100 mL (addEASE)  3,000 mg IntraVENous On Call to OR Jonny Parker DO           Allergies:  No Known Allergies    Problem List:    Patient Active Problem List   Diagnosis Code    Peroneal tendinitis M76.70    Achilles tendinitis, right

## 2025-07-23 NOTE — H&P
Orthopedic Preoperative Note      CHIEF COMPLAINT:  R knee pain    HISTORY OF PRESENT ILLNESS:      The patient is a 75 y.o. male with R  knee pain due to medial meniscus tear    Past Medical History:    History reviewed. No pertinent past medical history.    Past Surgical History:    History reviewed. No pertinent surgical history.    Medications Prior to Admission:   Prior to Admission medications    Medication Sig Start Date End Date Taking? Authorizing Provider   traMADol (ULTRAM) 50 MG tablet Take 1 tablet by mouth every 4 hours as needed for Pain for up to 7 days. Intended supply: 7 days. Take lowest dose possible to manage pain Max Daily Amount: 300 mg  Patient not taking: Reported on 7/21/2025 7/18/25 7/25/25  Jonny Parker DO   cyclobenzaprine (FLEXERIL) 5 MG tablet Take 1 tablet by mouth nightly for 10 days  Patient not taking: Reported on 7/21/2025 7/18/25 7/28/25  Jonny Parker DO   diclofenac (VOLTAREN) 50 MG EC tablet Take 1 tablet by mouth 2 times daily (with meals)  Patient not taking: Reported on 7/18/2025 7/1/25   Jonny Parker DO       Allergies:    Patient has no known allergies.    Social History:   Social History     Socioeconomic History    Marital status:      Spouse name: None    Number of children: None    Years of education: None    Highest education level: None   Tobacco Use    Smoking status: Never    Smokeless tobacco: Never   Vaping Use    Vaping status: Never Used   Substance and Sexual Activity    Alcohol use: Yes     Comment: social     Drug use: No    Sexual activity: Yes     Partners: Female       Family History:  History reviewed. No pertinent family history.      REVIEW OF SYSTEMS:  Review of Systems   Constitutional: Negative for fever and chills.   HENT: Negative for congestion and eye pain.    Eyes: Negative for blurred vision and double vision.   Respiratory: Negative for cough, shortness of breath and wheezing.    Cardiovascular: Negative for chest pain

## 2025-07-23 NOTE — BRIEF OP NOTE
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

## 2025-07-23 NOTE — PROGRESS NOTES
Patient admitted from OR to PACU.  Bedside report received.  Patient immediately hooked up to heart monitor.  LMA in place.Micki Gillespie RN

## 2025-07-23 NOTE — PROGRESS NOTES
Discharge instructions given to patient and patients wife.  Both deny any questions at this time.Micki Gillespie RN

## 2025-07-23 NOTE — ANESTHESIA POSTPROCEDURE EVALUATION
Department of Anesthesiology  Postprocedure Note    Patient: Ramana Albert  MRN: [id number]  YOB: 1950  Date of evaluation: 7/23/2025    Procedure Summary       Date: 07/23/25 Room / Location: [address]    Anesthesia Start: 1025 Anesthesia Stop: 1124    Procedure: RIGHT KNEE VIDEO ARTHROSCOPY PARTIAL MEDIAL MENISCECTOMY WITH PLICA INCISION (Right: Knee) Diagnosis:       Complex tear of medial meniscus of right knee as current injury      (Complex tear of medial meniscus of right knee as current injury [S83.231A])    Surgeons: Jonny Parker DO Responsible Provider: Rona Lutz MD    Anesthesia Type: general ASA Status: 3            Anesthesia Type: No value filed.    Rod Phase I: Rod Score: 8    Rod Phase II: Rod Score: 10    Anesthesia Post Evaluation    Comments: Postoperative Anesthesia Note    Name:    Ramana Albert  MRN:      [id number]    Patient Vitals in the past 12 hrs:  07/23/25 1234, Pulse:80, Resp:17, SpO2:96 %  07/23/25 1230, BP:132/78, Temp:98 °F (36.7 °C), Temp src:Oral, Pulse:83, Resp:18, SpO2:94 %  07/23/25 1225, Pulse:75, Resp:17, SpO2:96 %  07/23/25 1220, Pulse:78, Resp:10, SpO2:90 %  07/23/25 1215, BP:130/76, Pulse:75, Resp:12, SpO2:95 %  07/23/25 1205, BP:(!) 141/79, Pulse:74, Resp:14, SpO2:95 %  07/23/25 1150, Pulse:78, Resp:11, SpO2:92 %  07/23/25 1145, BP:134/86, Pulse:78, Resp:10, SpO2:95 %  07/23/25 1142, BP:134/86, Temp:97.2 °F (36.2 °C), Temp src:Axillary, Pulse:74, Resp:13, SpO2:95 %  07/23/25 1140, Pulse:80, Resp:(!) 8, SpO2:94 %  07/23/25 1135, BP:129/80, Pulse:80, Resp:(!) 9, SpO2:92 %  07/23/25 1132, BP:(!) 142/88, Pulse:73, Resp:10, SpO2:93 %  07/23/25 1130, BP:(!) 142/88, Pulse:74, Resp:11, SpO2:92 %  07/23/25 1125, BP:115/72, Pulse:79, Resp:11, SpO2:(!) 89 %  07/23/25 1120, BP:116/71, Temp:97.1 °F (36.2 °C), Temp src:Axillary  07/23/25 0915, BP:(!) 143/83, Temp:97.9 °F (36.6 °C), Temp src:Oral, Pulse:84,

## 2025-07-23 NOTE — PROGRESS NOTES
Patient discharge via wheelchair in stable condition with all belongings to private car.Micki Gillespie RN

## 2025-07-23 NOTE — TELEPHONE ENCOUNTER
Patient calling to see if someone can discuss a little bit with him about his sx case and what they found,     Please advise     [phone]

## 2025-07-23 NOTE — DISCHARGE INSTRUCTIONS
Jonny Parker, DO   ARTHROSCOPIC KNEE SURGERY    1. Elevate the operated area above heart level and apply ice bag 20 minutes of every hour to operative extremity.  2. Remove dressing in 2 days and leave open to air, ok to place bandaids if oozing. DO NOT remove sutures, staples or Steri-strips.  3. Use crutches as needed.  4. Toe touch weight bearing with crutches for assistance to operative extremity, and wean to full weight bearing as tolerated.  5. Ankle pumps frequently. Take a baby aspirin twice a day for blood clot prevention for 2-3 weeks after surgery.  6. Follow up with previously scheduled appointment or call the office for an appointment if you do not already have one ([phone]). I would like to see you back in 10-14 days.  7. Call your doctor if:   Your incision becomes red, swollen or develops drainage  If the wound becomes increasingly painful uncontrolled with the pain medications.   If you develop fever greater than 101 degrees after the first 48 hours.  8. Please call with questions/concerns.    Jonny Parker, DO  Orthopedic Surgery and Sports Medicine  ANESTHESIA DISCHARGE INSTRUCTIONS    You are under the influence of drugs- do not drink alcohol, drive a car, operate machinery(such as power tools, kitchen appliances, etc), sign legal documents, or make any important decisions for 24 hours (or while on pain medications).   Children should not ride bikes or skate boards or play on gym sets  for 24 hours after surgery.  A responsible adult should be with you for 24 hours.  Rest at home today- increase activity as tolerated.  Progress slowly to a regular diet unless your physician has instructed you otherwise. Drink plenty of water.    CALL YOUR DOCTOR IF YOU:  Have moderate to severe nausea or vomiting AND are unable to hold down fluids or prescribed medications.  Have bright red bloody drainage from your dressing that won't stop oozing.  Do not get relief with your pain

## 2025-07-25 NOTE — TELEPHONE ENCOUNTER
Spoke to pt and informed him he had a meniscus tear and Dr. Parker was able to take care of and he will go into more detail at his follow up appt.

## 2025-08-06 ENCOUNTER — OFFICE VISIT (OUTPATIENT)
Dept: ORTHOPEDIC SURGERY | Age: 75
End: 2025-08-06

## 2025-08-06 VITALS — HEIGHT: 71 IN | BODY MASS INDEX: 40.6 KG/M2 | WEIGHT: 290 LBS

## 2025-08-06 DIAGNOSIS — S83.231A COMPLEX TEAR OF MEDIAL MENISCUS OF RIGHT KNEE AS CURRENT INJURY, INITIAL ENCOUNTER: Primary | ICD-10-CM

## 2025-08-06 PROCEDURE — 99024 POSTOP FOLLOW-UP VISIT: CPT | Performed by: STUDENT IN AN ORGANIZED HEALTH CARE EDUCATION/TRAINING PROGRAM

## (undated) DEVICE — OPTIFOAM GENTLE EX, BORDERED, 4X4: Brand: MEDLINE

## (undated) DEVICE — BANDAGE COMPR W6INXL12FT SMOOTH FOR LIMB EXSANG ESMARCH

## (undated) DEVICE — SUTURE MONOCRYL + SZ 4-0 L18IN ABSRB UD L19MM PS-2 3/8 CIR MCP496G

## (undated) DEVICE — GLOVE ORANGE PI 8   MSG9080

## (undated) DEVICE — SUTURE VICRYL SZ 2-0 L18IN ABSRB UD CT-1 L36MM 1/2 CIR J839D

## (undated) DEVICE — TUBING PMP IRRIG GOFLO

## (undated) DEVICE — ZIMMER® STERILE DISPOSABLE TOURNIQUET CUFF WITH PLC, DUAL PORT, SINGLE BLADDER, 30 IN. (76 CM)

## (undated) DEVICE — SUTURE VICRYL + SZ 0 L27IN ABSRB UD L36MM CT-1 1/2 CIR VCPP41D

## (undated) DEVICE — 4.5 MM INCISOR PLUS STRAIGHT                                    BLADES, POWER/EP-1, VIOLET, PACKAGED                                    6 PER BOX, STERILE

## (undated) DEVICE — STERILE POLYISOPRENE POWDER-FREE SURGICAL GLOVES WITH EMOLLIENT COATING: Brand: PROTEXIS

## (undated) DEVICE — COTTON UNDERCAST PADDING,CRIMPED FINISH: Brand: WEBRIL

## (undated) DEVICE — ADHESIVE SKIN CLOSURE WND 8.661X1.5 IN 22 CM LIQUIBAND SECUR

## (undated) DEVICE — BLADE,STAINLESS-STEEL,15,STRL,DISPOSABLE: Brand: MEDLINE

## (undated) DEVICE — GOWN SIRUS NONREIN XL W/TWL: Brand: MEDLINE INDUSTRIES, INC.

## (undated) DEVICE — GLOVE ORANGE PI 7 1/2   MSG9075

## (undated) DEVICE — MHCZ ARTHROSCOPY: Brand: MEDLINE INDUSTRIES, INC.